# Patient Record
Sex: MALE | Race: WHITE | Employment: PART TIME | URBAN - NONMETROPOLITAN AREA
[De-identification: names, ages, dates, MRNs, and addresses within clinical notes are randomized per-mention and may not be internally consistent; named-entity substitution may affect disease eponyms.]

---

## 2017-01-10 ENCOUNTER — COMMUNICATION - GICH (OUTPATIENT)
Dept: FAMILY MEDICINE | Facility: OTHER | Age: 37
End: 2017-01-10

## 2018-01-02 NOTE — TELEPHONE ENCOUNTER
Patient Information     Patient Name MRN Jamari Bautista [id number] Male 1980      Telephone Encounter by Rubi Crouch at 1/10/2017  2:36 PM     Author:  Rubi Crouch Service:  (none) Author Type:  (none)     Filed:  1/10/2017  2:44 PM Encounter Date:  1/10/2017 Status:  Signed     :  Rubi Crouch            This patient was on [employer] but he's not anymore since he lives in [facility] now.  When he was, he had appointments at [facility] with Jhoana Kelly on 10/7/16 for a general medical exam (Z00.0), left knee pain (M25.562), back sciatica (M54.42), nicotine dependence (F17.200), chronic pain (G89.29), lumbago with sciatica, right side (M54.41), on 11/10/16 for back sciatica (M54.42), pain in right knee (M25.561), lumbago with sciatica, right side (M54.41), left knee pain (M25.562), and chronic pain (G89.29), and on 16 for encounter for immunization (Z23), lumbago with sciatica, right side (M54.41), chronic pain (G89.29), and unspecified osteoarthritis, unspecified site (M19.90).  Since we were the PCC through [facility], all insurance referrals needed to go through us.  Will you approve these insurance referrals?  Rubi Crouch ....................  1/10/2017   2:42 PM

## 2018-01-03 ENCOUNTER — HISTORY (OUTPATIENT)
Dept: EMERGENCY MEDICINE | Facility: OTHER | Age: 38
End: 2018-01-03

## 2018-01-03 NOTE — TELEPHONE ENCOUNTER
Patient Information     Patient Name MRN Jamari Bautista [id number] Male 1980      Telephone Encounter by Harriet Vicente MD at 1/10/2017  2:54 PM     Author:  Harriet Vicenet MD Service:  (none) Author Type:  Physician     Filed:  1/10/2017  2:55 PM Encounter Date:  1/10/2017 Status:  Signed     :  Harriet Vicente MD (Physician)            No contact with him since  and NO I will not!  Harriet Vicente MD  2:55 PM 1/10/2017

## 2018-01-03 NOTE — TELEPHONE ENCOUNTER
Patient Information     Patient Name MRN Jamari Bautista [id number] Male 1980      Telephone Encounter by Rubi rCouch at 1/10/2017  3:19 PM     Author:  Rubi Crouch Service:  (none) Author Type:  (none)     Filed:  1/10/2017  3:20 PM Encounter Date:  1/10/2017 Status:  Signed     :  Rubi Crouch            I called Mariia back at [facility] on 1/10/17 at [phone] at 3:18 and explained that I would not be able to do these referrals.  Rubi Crouch ....................  1/10/2017   3:19 PM

## 2018-01-04 ENCOUNTER — AMBULATORY - GICH (OUTPATIENT)
Dept: SCHEDULING | Facility: OTHER | Age: 38
End: 2018-01-04

## 2018-02-13 ENCOUNTER — DOCUMENTATION ONLY (OUTPATIENT)
Dept: FAMILY MEDICINE | Facility: OTHER | Age: 38
End: 2018-02-13

## 2018-02-13 PROBLEM — M54.50 LUMBAGO: Status: ACTIVE | Noted: 2018-02-13

## 2018-02-13 RX ORDER — BUPROPION HYDROCHLORIDE 150 MG/1
150 TABLET ORAL DAILY
COMMUNITY
Start: 2014-09-30 | End: 2024-08-06

## 2018-02-13 RX ORDER — OXYCODONE AND ACETAMINOPHEN 5; 325 MG/1; MG/1
1 TABLET ORAL EVERY 4 HOURS PRN
COMMUNITY
Start: 2018-01-03 | End: 2024-08-06

## 2018-02-13 RX ORDER — IBUPROFEN 800 MG/1
800 TABLET, FILM COATED ORAL EVERY 8 HOURS PRN
COMMUNITY
Start: 2012-09-21 | End: 2024-08-06

## 2018-02-13 RX ORDER — ONDANSETRON 4 MG/1
4 TABLET, ORALLY DISINTEGRATING ORAL EVERY 8 HOURS PRN
COMMUNITY
Start: 2018-01-03 | End: 2024-08-06

## 2020-03-17 ENCOUNTER — NURSE TRIAGE (OUTPATIENT)
Dept: FAMILY MEDICINE | Facility: OTHER | Age: 40
End: 2020-03-17

## 2020-03-17 NOTE — TELEPHONE ENCOUNTER
"Pt hard transferred to triage with c/o of dry cough and heaviness in lungs that started 1 week ago and has gotten worse in that the irritation started in throat and now feels as lungs are \"heavy\".     Pt traveled to [facility] mid last week    Pt denies fever, knowledge of flu/COVID 19 exposure, wheezing, chest pain, hx of respiratory illness/disease.  Pt describes cough as dry but was able to cough up clear sputum a couple of times. States he has a heaviness in lungs/chest that seems worse when climbing stairs but absent at rest.  Pt was able to speak complete sentences with ease.     Consulted with provider and as advised offered Pt clinic appt as long as he continued to be afebrile and educated on home care.  Pt understanding and will monitor for worsening of symptoms for now and call back with worsening of symptoms     Jimena Aragon RN  ....................  3/17/2020   5:06 PM                                                 "

## 2021-04-19 ENCOUNTER — ALLIED HEALTH/NURSE VISIT (OUTPATIENT)
Dept: FAMILY MEDICINE | Facility: OTHER | Age: 41
End: 2021-04-19
Attending: FAMILY MEDICINE
Payer: COMMERCIAL

## 2021-04-19 DIAGNOSIS — R50.9 FEVER AND CHILLS: Primary | ICD-10-CM

## 2021-04-19 DIAGNOSIS — R52 BODY ACHES: ICD-10-CM

## 2021-04-19 PROCEDURE — U0005 INFEC AGEN DETEC AMPLI PROBE: HCPCS | Mod: ZL | Performed by: FAMILY MEDICINE

## 2021-04-19 PROCEDURE — U0003 INFECTIOUS AGENT DETECTION BY NUCLEIC ACID (DNA OR RNA); SEVERE ACUTE RESPIRATORY SYNDROME CORONAVIRUS 2 (SARS-COV-2) (CORONAVIRUS DISEASE [COVID-19]), AMPLIFIED PROBE TECHNIQUE, MAKING USE OF HIGH THROUGHPUT TECHNOLOGIES AS DESCRIBED BY CMS-2020-01-R: HCPCS | Mod: ZL | Performed by: FAMILY MEDICINE

## 2021-04-19 PROCEDURE — C9803 HOPD COVID-19 SPEC COLLECT: HCPCS

## 2021-04-21 LAB
LABORATORY COMMENT REPORT: ABNORMAL
SARS-COV-2 RNA RESP QL NAA+PROBE: NORMAL
SARS-COV-2 RNA RESP QL NAA+PROBE: POSITIVE
SPECIMEN SOURCE: ABNORMAL
SPECIMEN SOURCE: NORMAL

## 2021-06-13 ENCOUNTER — HEALTH MAINTENANCE LETTER (OUTPATIENT)
Age: 41
End: 2021-06-13

## 2021-10-03 ENCOUNTER — HEALTH MAINTENANCE LETTER (OUTPATIENT)
Age: 41
End: 2021-10-03

## 2022-07-09 ENCOUNTER — HEALTH MAINTENANCE LETTER (OUTPATIENT)
Age: 42
End: 2022-07-09

## 2022-09-04 ENCOUNTER — HEALTH MAINTENANCE LETTER (OUTPATIENT)
Age: 42
End: 2022-09-04

## 2023-07-23 ENCOUNTER — HEALTH MAINTENANCE LETTER (OUTPATIENT)
Age: 43
End: 2023-07-23

## 2024-04-20 ENCOUNTER — OFFICE VISIT (OUTPATIENT)
Dept: FAMILY MEDICINE | Facility: OTHER | Age: 44
End: 2024-04-20
Attending: NURSE PRACTITIONER
Payer: COMMERCIAL

## 2024-04-20 VITALS
WEIGHT: 154.6 LBS | HEIGHT: 68 IN | DIASTOLIC BLOOD PRESSURE: 80 MMHG | RESPIRATION RATE: 17 BRPM | OXYGEN SATURATION: 96 % | SYSTOLIC BLOOD PRESSURE: 138 MMHG | HEART RATE: 87 BPM | BODY MASS INDEX: 23.43 KG/M2 | TEMPERATURE: 96.6 F

## 2024-04-20 DIAGNOSIS — T15.92XA FOREIGN BODY, EYE, LEFT, INITIAL ENCOUNTER: Primary | ICD-10-CM

## 2024-04-20 DIAGNOSIS — H57.12 DISCOMFORT OF LEFT EYE: ICD-10-CM

## 2024-04-20 PROCEDURE — 250N000009 HC RX 250

## 2024-04-20 PROCEDURE — 99213 OFFICE O/P EST LOW 20 MIN: CPT | Performed by: REGISTERED NURSE

## 2024-04-20 RX ORDER — ERYTHROMYCIN 5 MG/G
0.5 OINTMENT OPHTHALMIC 4 TIMES DAILY
Qty: 30 G | Refills: 0 | Status: SHIPPED | OUTPATIENT
Start: 2024-04-20 | End: 2024-08-06

## 2024-04-20 RX ORDER — TETRACAINE HYDROCHLORIDE 5 MG/ML
1-2 SOLUTION OPHTHALMIC ONCE
Status: COMPLETED | OUTPATIENT
Start: 2024-04-20 | End: 2024-04-20

## 2024-04-20 RX ADMIN — TETRACAINE HYDROCHLORIDE 2 DROP: 5 SOLUTION OPHTHALMIC at 13:15

## 2024-04-20 RX ADMIN — FLUORESCEIN SODIUM 1 STRIP: 1 STRIP OPHTHALMIC at 13:15

## 2024-04-20 ASSESSMENT — VISUAL ACUITY: OU: 1

## 2024-04-20 ASSESSMENT — PAIN SCALES - GENERAL: PAINLEVEL: MILD PAIN (2)

## 2024-04-20 NOTE — PATIENT INSTRUCTIONS
Use the ointment up to 4 times a day. This will help lubricate your eye and help with pain.   You will need to see an eye doctor on Monday.

## 2024-04-20 NOTE — NURSING NOTE
"Chief Complaint   Patient presents with    Eye Problem     Something in left eye     Patient here for something in left eye. He states he was cleaning a tote out and something got in his eye.    Initial /80   Pulse 87   Temp (!) 96.6  F (35.9  C) (Tympanic)   Resp 17   Ht 1.715 m (5' 7.5\")   Wt 70.1 kg (154 lb 9.6 oz)   SpO2 96%   BMI 23.86 kg/m   Estimated body mass index is 23.86 kg/m  as calculated from the following:    Height as of this encounter: 1.715 m (5' 7.5\").    Weight as of this encounter: 70.1 kg (154 lb 9.6 oz).  Medication Review: complete    The next two questions are to help us understand your food security.  If you are feeling you need any assistance in this area, we have resources available to support you today.          4/20/2024   SDOH- Food Insecurity   Within the past 12 months, did you worry that your food would run out before you got money to buy more? N   Within the past 12 months, did the food you bought just not last and you didn t have money to get more? N         Health Care Directive:  Patient does not have a Health Care Directive or Living Will: Discussed advance care planning with patient; however, patient declined at this time.    Dionna Daley LPN      "

## 2024-04-20 NOTE — PROGRESS NOTES
"Jamari Schafer  1980    ASSESSMENT/PLAN:   1. Foreign body, eye, left, initial encounter  2. Discomfort of left eye    Day history of foreign body to left eye.  He believes this is dirt or dust.  He has not been working with wood or metal.  Fluorescein exam performed to left eye with pinpoint speck noted over left iris at 3 o'clock position.  Unable to remove.  Will need follow-up with ophthalmology.  Erythromycin ointment 4 times daily over the weekend.  He has been instructed to present to the ophthalmologist first thing Monday morning.  Patient agreeable with this plan.    - tetracaine (PONTOCAINE) 0.5 % ophthalmic solution 1-2 drop  - fluorescein (FUL-ANDREEA) ophthalmic strip 1 strip  - sodium chloride (PF) 0.9% PF flush 3 mL  - erythromycin (ROMYCIN) 5 MG/GM ophthalmic ointment; Place 0.5 inches Into the left eye 4 times daily  Dispense: 30 g; Refill: 0       Patient agrees with plan of care and verbalizes understating. AVS printed. Patient education provided verbally and written instructions provided as requested. Patient made aware of emergent signs and symptoms to monitor for and when to seek additional care/follow up.     SUBJECTIVE:   CHIEF COMPLAINT/ REASON FOR VISIT  Patient presents with:  Eye Problem: Something in left eye     HISTORY OF PRESENT ILLNESS  Jamari Schafer is a pleasant 44 year old male presents to [facility] clinic today for evaluation of left eye irritation.  He feels like there is something in his eye.  Possibly dust.  He has not been working with any wood or metal.  He has irritation but no pain.  Denies double vision or blurry vision.      I have reviewed the nursing notes.  I have reviewed allergies, medication list, problem list, and past medical history.    REVIEW OF SYSTEMS  See HPI    VITAL SIGNS  Vitals:    04/20/24 1204   BP: 138/80   Pulse: 87   Resp: 17   Temp: (!) 96.6  F (35.9  C)   TempSrc: Tympanic   SpO2: 96%   Weight: 70.1 kg (154 lb 9.6 oz)   Height: 1.715 m (5' 7.5\")    "   Body mass index is 23.86 kg/m .    OBJECTIVE:   PHYSICAL EXAM  Physical Exam  Constitutional:       Appearance: He is not ill-appearing.   Eyes:      General: Vision grossly intact.         Left eye: Foreign body present.     Extraocular Movements: Extraocular movements intact.      Conjunctiva/sclera: Conjunctivae normal.      Slit lamp exam:     Left eye: Foreign body present.      Comments: Left eye speck noted at 3 o'clock position on iris.   Neurological:      Mental Status: He is alert.        DIAGNOSTICS  No results found for any visits on 04/20/24.     ANTHONY Simmons [facility]

## 2024-08-06 ENCOUNTER — OFFICE VISIT (OUTPATIENT)
Dept: FAMILY MEDICINE | Facility: OTHER | Age: 44
End: 2024-08-06
Attending: NURSE PRACTITIONER
Payer: COMMERCIAL

## 2024-08-06 VITALS
TEMPERATURE: 97.4 F | RESPIRATION RATE: 16 BRPM | BODY MASS INDEX: 25.61 KG/M2 | HEART RATE: 82 BPM | DIASTOLIC BLOOD PRESSURE: 74 MMHG | OXYGEN SATURATION: 97 % | WEIGHT: 169 LBS | SYSTOLIC BLOOD PRESSURE: 128 MMHG | HEIGHT: 68 IN

## 2024-08-06 DIAGNOSIS — F19.21 DRUG DEPENDENCE, IN REMISSION (H): ICD-10-CM

## 2024-08-06 DIAGNOSIS — Z00.00 ROUTINE GENERAL MEDICAL EXAMINATION AT A HEALTH CARE FACILITY: Primary | ICD-10-CM

## 2024-08-06 DIAGNOSIS — Z13.29 SCREENING FOR THYROID DISORDER: ICD-10-CM

## 2024-08-06 DIAGNOSIS — Z11.59 NEED FOR HEPATITIS C SCREENING TEST: ICD-10-CM

## 2024-08-06 DIAGNOSIS — Z72.0 TOBACCO USE: ICD-10-CM

## 2024-08-06 DIAGNOSIS — Z13.220 SCREENING FOR CHOLESTEROL LEVEL: ICD-10-CM

## 2024-08-06 DIAGNOSIS — F41.8 DEPRESSION WITH ANXIETY: ICD-10-CM

## 2024-08-06 DIAGNOSIS — Z11.4 SCREENING FOR HIV (HUMAN IMMUNODEFICIENCY VIRUS): ICD-10-CM

## 2024-08-06 DIAGNOSIS — Z01.84 IMMUNITY STATUS TESTING: ICD-10-CM

## 2024-08-06 DIAGNOSIS — Z23 ENCOUNTER FOR IMMUNIZATION: ICD-10-CM

## 2024-08-06 LAB
ALBUMIN SERPL BCG-MCNC: 4.9 G/DL (ref 3.5–5.2)
ALP SERPL-CCNC: 87 U/L (ref 40–150)
ALT SERPL W P-5'-P-CCNC: 26 U/L (ref 0–70)
ANION GAP SERPL CALCULATED.3IONS-SCNC: 9 MMOL/L (ref 7–15)
AST SERPL W P-5'-P-CCNC: 30 U/L (ref 0–45)
BASOPHILS # BLD AUTO: 0 10E3/UL (ref 0–0.2)
BASOPHILS NFR BLD AUTO: 0 %
BILIRUB SERPL-MCNC: 0.5 MG/DL
BUN SERPL-MCNC: 22 MG/DL (ref 6–20)
C TRACH DNA SPEC QL PROBE+SIG AMP: NEGATIVE
CALCIUM SERPL-MCNC: 10.2 MG/DL (ref 8.8–10.4)
CHLORIDE SERPL-SCNC: 103 MMOL/L (ref 98–107)
CHOLEST SERPL-MCNC: 244 MG/DL
CREAT SERPL-MCNC: 1.2 MG/DL (ref 0.67–1.17)
EGFRCR SERPLBLD CKD-EPI 2021: 76 ML/MIN/1.73M2
EOSINOPHIL # BLD AUTO: 0.3 10E3/UL (ref 0–0.7)
EOSINOPHIL NFR BLD AUTO: 4 %
ERYTHROCYTE [DISTWIDTH] IN BLOOD BY AUTOMATED COUNT: 13.3 % (ref 10–15)
FASTING STATUS PATIENT QL REPORTED: NO
FASTING STATUS PATIENT QL REPORTED: NO
GLUCOSE SERPL-MCNC: 63 MG/DL (ref 70–99)
HBV SURFACE AB SERPL IA-ACNC: <3.5 M[IU]/ML
HBV SURFACE AB SERPL IA-ACNC: NONREACTIVE M[IU]/ML
HBV SURFACE AG SERPL QL IA: NONREACTIVE
HCO3 SERPL-SCNC: 29 MMOL/L (ref 22–29)
HCT VFR BLD AUTO: 45.7 % (ref 40–53)
HCV AB SERPL QL IA: NONREACTIVE
HDLC SERPL-MCNC: 78 MG/DL
HGB BLD-MCNC: 15.5 G/DL (ref 13.3–17.7)
HIV 1+2 AB+HIV1 P24 AG SERPL QL IA: NONREACTIVE
IMM GRANULOCYTES # BLD: 0 10E3/UL
IMM GRANULOCYTES NFR BLD: 0 %
LDLC SERPL CALC-MCNC: 148 MG/DL
LYMPHOCYTES # BLD AUTO: 1.8 10E3/UL (ref 0.8–5.3)
LYMPHOCYTES NFR BLD AUTO: 27 %
MCH RBC QN AUTO: 28.4 PG (ref 26.5–33)
MCHC RBC AUTO-ENTMCNC: 33.9 G/DL (ref 31.5–36.5)
MCV RBC AUTO: 84 FL (ref 78–100)
MONOCYTES # BLD AUTO: 0.6 10E3/UL (ref 0–1.3)
MONOCYTES NFR BLD AUTO: 8 %
N GONORRHOEA DNA SPEC QL NAA+PROBE: NEGATIVE
NEUTROPHILS # BLD AUTO: 4.1 10E3/UL (ref 1.6–8.3)
NEUTROPHILS NFR BLD AUTO: 60 %
NONHDLC SERPL-MCNC: 166 MG/DL
NRBC # BLD AUTO: 0 10E3/UL
NRBC BLD AUTO-RTO: 0 /100
PLATELET # BLD AUTO: 272 10E3/UL (ref 150–450)
POTASSIUM SERPL-SCNC: 4.3 MMOL/L (ref 3.4–5.3)
PROT SERPL-MCNC: 7.7 G/DL (ref 6.4–8.3)
RBC # BLD AUTO: 5.45 10E6/UL (ref 4.4–5.9)
SODIUM SERPL-SCNC: 141 MMOL/L (ref 135–145)
TRIGL SERPL-MCNC: 90 MG/DL
TSH SERPL DL<=0.005 MIU/L-ACNC: 3.61 UIU/ML (ref 0.3–4.2)
WBC # BLD AUTO: 6.9 10E3/UL (ref 4–11)

## 2024-08-06 PROCEDURE — 36415 COLL VENOUS BLD VENIPUNCTURE: CPT | Mod: ZL | Performed by: NURSE PRACTITIONER

## 2024-08-06 PROCEDURE — 90471 IMMUNIZATION ADMIN: CPT | Performed by: NURSE PRACTITIONER

## 2024-08-06 PROCEDURE — 86803 HEPATITIS C AB TEST: CPT | Mod: ZL | Performed by: NURSE PRACTITIONER

## 2024-08-06 PROCEDURE — 84450 TRANSFERASE (AST) (SGOT): CPT | Mod: ZL | Performed by: NURSE PRACTITIONER

## 2024-08-06 PROCEDURE — 99213 OFFICE O/P EST LOW 20 MIN: CPT | Mod: 25 | Performed by: NURSE PRACTITIONER

## 2024-08-06 PROCEDURE — 87491 CHLMYD TRACH DNA AMP PROBE: CPT | Mod: ZL | Performed by: NURSE PRACTITIONER

## 2024-08-06 PROCEDURE — 99386 PREV VISIT NEW AGE 40-64: CPT | Mod: 25 | Performed by: NURSE PRACTITIONER

## 2024-08-06 PROCEDURE — 82465 ASSAY BLD/SERUM CHOLESTEROL: CPT | Mod: ZL | Performed by: NURSE PRACTITIONER

## 2024-08-06 PROCEDURE — 86706 HEP B SURFACE ANTIBODY: CPT | Mod: ZL | Performed by: NURSE PRACTITIONER

## 2024-08-06 PROCEDURE — 87389 HIV-1 AG W/HIV-1&-2 AB AG IA: CPT | Mod: ZL | Performed by: NURSE PRACTITIONER

## 2024-08-06 PROCEDURE — 86780 TREPONEMA PALLIDUM: CPT | Mod: ZL | Performed by: NURSE PRACTITIONER

## 2024-08-06 PROCEDURE — 90715 TDAP VACCINE 7 YRS/> IM: CPT | Performed by: NURSE PRACTITIONER

## 2024-08-06 PROCEDURE — 87340 HEPATITIS B SURFACE AG IA: CPT | Mod: ZL | Performed by: NURSE PRACTITIONER

## 2024-08-06 PROCEDURE — 85025 COMPLETE CBC W/AUTO DIFF WBC: CPT | Mod: ZL | Performed by: NURSE PRACTITIONER

## 2024-08-06 PROCEDURE — 84443 ASSAY THYROID STIM HORMONE: CPT | Mod: ZL | Performed by: NURSE PRACTITIONER

## 2024-08-06 SDOH — HEALTH STABILITY: PHYSICAL HEALTH: ON AVERAGE, HOW MANY DAYS PER WEEK DO YOU ENGAGE IN MODERATE TO STRENUOUS EXERCISE (LIKE A BRISK WALK)?: 5 DAYS

## 2024-08-06 ASSESSMENT — PATIENT HEALTH QUESTIONNAIRE - PHQ9
SUM OF ALL RESPONSES TO PHQ QUESTIONS 1-9: 7
SUM OF ALL RESPONSES TO PHQ QUESTIONS 1-9: 7
10. IF YOU CHECKED OFF ANY PROBLEMS, HOW DIFFICULT HAVE THESE PROBLEMS MADE IT FOR YOU TO DO YOUR WORK, TAKE CARE OF THINGS AT HOME, OR GET ALONG WITH OTHER PEOPLE: SOMEWHAT DIFFICULT

## 2024-08-06 ASSESSMENT — ANXIETY QUESTIONNAIRES
5. BEING SO RESTLESS THAT IT IS HARD TO SIT STILL: SEVERAL DAYS
3. WORRYING TOO MUCH ABOUT DIFFERENT THINGS: NOT AT ALL
7. FEELING AFRAID AS IF SOMETHING AWFUL MIGHT HAPPEN: NOT AT ALL
GAD7 TOTAL SCORE: 1
8. IF YOU CHECKED OFF ANY PROBLEMS, HOW DIFFICULT HAVE THESE MADE IT FOR YOU TO DO YOUR WORK, TAKE CARE OF THINGS AT HOME, OR GET ALONG WITH OTHER PEOPLE?: NOT DIFFICULT AT ALL
4. TROUBLE RELAXING: NOT AT ALL
GAD7 TOTAL SCORE: 1
1. FEELING NERVOUS, ANXIOUS, OR ON EDGE: NOT AT ALL
GAD7 TOTAL SCORE: 1
IF YOU CHECKED OFF ANY PROBLEMS ON THIS QUESTIONNAIRE, HOW DIFFICULT HAVE THESE PROBLEMS MADE IT FOR YOU TO DO YOUR WORK, TAKE CARE OF THINGS AT HOME, OR GET ALONG WITH OTHER PEOPLE: NOT DIFFICULT AT ALL
6. BECOMING EASILY ANNOYED OR IRRITABLE: NOT AT ALL
2. NOT BEING ABLE TO STOP OR CONTROL WORRYING: NOT AT ALL
7. FEELING AFRAID AS IF SOMETHING AWFUL MIGHT HAPPEN: NOT AT ALL

## 2024-08-06 ASSESSMENT — PAIN SCALES - GENERAL: PAINLEVEL: NO PAIN (0)

## 2024-08-06 ASSESSMENT — SOCIAL DETERMINANTS OF HEALTH (SDOH): HOW OFTEN DO YOU GET TOGETHER WITH FRIENDS OR RELATIVES?: MORE THAN THREE TIMES A WEEK

## 2024-08-06 NOTE — PATIENT INSTRUCTIONS
Screening for colon cancer is recommended at age 45. Make sure you get annual physical for this next year.   Labs today in process, std screening in process. Will be in touch with these results.   Recommend continuing to work with [facility] therapist as well as for adhd screening. I do not prescribe testosterone.       Patient Education   Preventive Care Advice   This is general advice given by our system to help you stay healthy. However, your care team may have specific advice just for you. Please talk to your care team about your preventive care needs.  Nutrition  Eat 5 or more servings of fruits and vegetables each day.  Try wheat bread, brown rice and whole grain pasta (instead of white bread, rice, and pasta).  Get enough calcium and vitamin D. Check the label on foods and aim for 100% of the RDA (recommended daily allowance).  Lifestyle  Exercise at least 150 minutes each week  (30 minutes a day, 5 days a week).  Do muscle strengthening activities 2 days a week. These help control your weight and prevent disease.  No smoking.  Wear sunscreen to prevent skin cancer.  Have a dental exam and cleaning every 6 months.  Yearly exams  See your health care team every year to talk about:  Any changes in your health.  Any medicines your care team has prescribed.  Preventive care, family planning, and ways to prevent chronic diseases.  Shots (vaccines)   HPV shots (up to age 26), if you've never had them before.  Hepatitis B shots (up to age 59), if you've never had them before.  COVID-19 shot: Get this shot when it's due.  Flu shot: Get a flu shot every year.  Tetanus shot: Get a tetanus shot every 10 years.  Pneumococcal, hepatitis A, and RSV shots: Ask your care team if you need these based on your risk.  Shingles shot (for age 50 and up)  General health tests  Diabetes screening:  Starting at age 35, Get screened for diabetes at least every 3 years.  If you are younger than age 35, ask your care team if you should  be screened for diabetes.  Cholesterol test: At age 39, start having a cholesterol test every 5 years, or more often if advised.  Bone density scan (DEXA): At age 50, ask your care team if you should have this scan for osteoporosis (brittle bones).  Hepatitis C: Get tested at least once in your life.  STIs (sexually transmitted infections)  Before age 24: Ask your care team if you should be screened for STIs.  After age 24: Get screened for STIs if you're at risk. You are at risk for STIs (including HIV) if:  You are sexually active with more than one person.  You don't use condoms every time.  You or a partner was diagnosed with a sexually transmitted infection.  If you are at risk for HIV, ask about PrEP medicine to prevent HIV.  Get tested for HIV at least once in your life, whether you are at risk for HIV or not.  Cancer screening tests  Cervical cancer screening: If you have a cervix, begin getting regular cervical cancer screening tests starting at age 21.  Breast cancer scan (mammogram): If you've ever had breasts, begin having regular mammograms starting at age 40. This is a scan to check for breast cancer.  Colon cancer screening: It is important to start screening for colon cancer at age 45.  Have a colonoscopy test every 10 years (or more often if you're at risk) Or, ask your provider about stool tests like a FIT test every year or Cologuard test every 3 years.  To learn more about your testing options, visit:   .  For help making a decision, visit:   https://bit.ly/vq81850.  Prostate cancer screening test: If you have a prostate, ask your care team if a prostate cancer screening test (PSA) at age 55 is right for you.  Lung cancer screening: If you are a current or former smoker ages 50 to 80, ask your care team if ongoing lung cancer screenings are right for you.  For informational purposes only. Not to replace the advice of your health care provider. Copyright   2023 [employer]. All rights  reserved. Clinically reviewed by the [facility] Transitions Program. [employer] 275899 - REV 01/24.  Learning About Depression Screening  What is depression screening?  Depression screening is a way to see if you have depression symptoms. It may be done by a doctor or counselor. It's often part of a routine checkup. That's because your mental health is just as important as your physical health.  Depression is a mental health condition that affects how you feel, think, and act. You may:  Have less energy.  Lose interest in your daily activities.  Feel sad and grouchy for a long time.  Depression is very common. It affects people of all ages.  Many things can lead to depression. Some people become depressed after they have a stroke or find out they have a major illness like cancer or heart disease. The death of a loved one or a breakup may lead to depression. It can run in families. Most experts believe that a combination of inherited genes and stressful life events can cause it.  What happens during screening?  You may be asked to fill out a form about your depression symptoms. You and the doctor will discuss your answers. The doctor may ask you more questions to learn more about how you think, act, and feel.  What happens after screening?  If you have symptoms of depression, your doctor will talk to you about your options.  Doctors usually treat depression with medicines or counseling. Often, combining the two works best. Many people don't get help because they think that they'll get over the depression on their own. But people with depression may not get better unless they get treatment.  The cause of depression is not well understood. There may be many factors involved. But if you have depression, it's not your fault.  A serious symptom of depression is thinking about death or suicide. If you or someone you care about talks about this or about feeling hopeless, get help right away.  It's important to know  "that depression can be treated. Medicine, counseling, and self-care may help.  Where can you learn more?  Go to https://www.[employer].net/patiented  Enter T185 in the search box to learn more about \"Learning About Depression Screening.\"  Current as of: June 24, 2023  Content Version: 14.1 2006-2024 [employer].   Care instructions adapted under license by your healthcare professional. If you have questions about a medical condition or this instruction, always ask your healthcare professional. [employer] disclaims any warranty or liability for your use of this information.    Substance Use Disorder: Care Instructions  Overview     You can improve your life and health by stopping your use of alcohol or drugs. When you don't drink or use drugs, you may feel and sleep better. You may get along better with your family, friends, and coworkers. There are medicines and programs that can help with substance use disorder.  How can you care for yourself at home?  Here are some ways to help you stay sober and prevent relapse.  If you have been given medicine to help keep you sober or reduce your cravings, be sure to take it exactly as prescribed.  Talk to your doctor about programs that can help you stop using drugs or drinking alcohol.  Do not keep alcohol or drugs in your home.  Plan ahead. Think about what you'll say if other people ask you to drink or use drugs. Try not to spend time with people who drink or use drugs.  Use the time and money spent on drinking or drugs to do something that's important to you.  Preventing a relapse  Have a plan to deal with relapse. Learn to recognize changes in your thinking that lead you to drink or use drugs. Get help before you start to drink or use drugs again.  Try to stay away from situations, friends, or places that may lead you to drink or use drugs.  If you feel the need to drink alcohol or use drugs again, seek help right away. Call a trusted friend " or family member. Some people get support from organizations such as Narcotics Anonymous or [employer] or from treatment facilities.  If you relapse, get help as soon as you can. Some people make a plan with another person that outlines what they want that person to do for them if they relapse. The plan usually includes how to handle the relapse and who to notify in case of relapse.  Don't give up. Remember that a relapse doesn't mean that you have failed. Use the experience to learn the triggers that lead you to drink or use drugs. Then quit again. Recovery is a lifelong process. Many people have several relapses before they are able to quit for good.  Follow-up care is a key part of your treatment and safety. Be sure to make and go to all appointments, and call your doctor if you are having problems. It's also a good idea to know your test results and keep a list of the medicines you take.  When should you call for help?   Call 911  anytime you think you may need emergency care. For example, call if you or someone else:    Has overdosed or has withdrawal signs. Be sure to tell the emergency workers that you are or someone else is using or trying to quit using drugs. Overdose or withdrawal signs may include:  Losing consciousness.  Seizure.  Seeing or hearing things that aren't there (hallucinations).     Is thinking or talking about suicide or harming others.   Where to get help 24 hours a day, 7 days a week   If you or someone you know talks about suicide, self-harm, a mental health crisis, a substance use crisis, or any other kind of emotional distress, get help right away. You can:    Call the Suicide and Crisis Lifeline at 446.     Call 8-664-662-TALK (1-115.238.7747).     Text HOME to 005400 to access the Crisis Text Line.   Consider saving these numbers in your phone.  Go to [employer].org for more information or to chat online.  Call your doctor now or seek immediate medical care if:    You are having  "withdrawal symptoms. These may include nausea or vomiting, sweating, shakiness, and anxiety.   Watch closely for changes in your health, and be sure to contact your doctor if:    You have a relapse.     You need more help or support to stop.   Where can you learn more?  Go to https://www.[employer].net/patiented  Enter H573 in the search box to learn more about \"Substance Use Disorder: Care Instructions.\"  Current as of: November 15, 2023               Content Version: 14.0    6593-6477 [employer].   Care instructions adapted under license by your healthcare professional. If you have questions about a medical condition or this instruction, always ask your healthcare professional. [employer] disclaims any warranty or liability for your use of this information.      Safer Sex: Care Instructions  Overview  Safer sex is a way to reduce your risk of getting a sexually transmitted infection (STI). It can also help prevent pregnancy.  Several products can help you practice safer sex and reduce your chance of STIs. One of the best is a condom. There are internal and external condoms. You can use a special rubber sheet (dental dam) for protection during oral sex. Disposable gloves can keep your hands from touching blood, semen, or other body fluids that can carry infections.  Remember that birth control methods such as diaphragms, IUDs, foams, and birth control pills do not stop you from getting STIs.  Follow-up care is a key part of your treatment and safety. Be sure to make and go to all appointments, and call your doctor if you are having problems. It's also a good idea to know your test results and keep a list of the medicines you take.  How can you care for yourself at home?  Think about getting vaccinated to help prevent hepatitis A, hepatitis B, and human papillomavirus (HPV). They can be spread through sex.  Use a condom every time you have sex. Use an external condom, which goes on the " "penis. Or use an internal condom, which goes into the vagina or anus.  Make sure you use the right size external condom. A condom that's too small can break easily. A condom that's too big can slip off during sex.  Use a new condom each time you have sex. Be careful not to poke a hole in the condom when you open the wrapper.  Don't use an internal condom and an external condom at the same time.  Never use petroleum jelly (such as Vaseline), grease, hand lotion, baby oil, or anything with oil in it. These products can make holes in the condom.  After intercourse, hold the edge of the condom as you remove it. This will help keep semen from spilling out of the condom.  Do not have sex with anyone who has symptoms of an STI, such as sores on the genitals or mouth.  Do not drink a lot of alcohol or use drugs before sex.  Limit your sex partners. Sex with one partner who has sex only with you can reduce your risk of getting an STI.  Don't share sex toys. But if you do share them, use a condom and clean the sex toys between each use.  Talk to any partners before you have sex. Talk about what you feel comfortable with and whether you have any boundaries with sex. And find out if your partner or partners may be at risk for any STI. Keep in mind that a person may be able to spread an STI even if they do not have symptoms. You and any partners may want to get tested for STIs.  Where can you learn more?  Go to https://www."[employer].net/patiented  Enter B608 in the search box to learn more about \"Safer Sex: Care Instructions.\"  Current as of: November 27, 2023               Content Version: 14.0    5931-2115 [employer].   Care instructions adapted under license by your healthcare professional. If you have questions about a medical condition or this instruction, always ask your healthcare professional. [employer] disclaims any warranty or liability for your use of this information.         "

## 2024-08-06 NOTE — NURSING NOTE
"Chief Complaint   Patient presents with    Physical       Initial /74   Pulse 82   Temp 97.4  F (36.3  C) (Tympanic)   Resp 16   Ht 1.727 m (5' 8\")   Wt 76.7 kg (169 lb)   SpO2 97%   BMI 25.70 kg/m   Estimated body mass index is 25.7 kg/m  as calculated from the following:    Height as of this encounter: 1.727 m (5' 8\").    Weight as of this encounter: 76.7 kg (169 lb).  Medication Review: complete    The next two questions are to help us understand your food security.  If you are feeling you need any assistance in this area, we have resources available to support you today.          8/6/2024   SDOH- Food Insecurity   Within the past 12 months, did you worry that your food would run out before you got money to buy more? N   Within the past 12 months, did the food you bought just not last and you didn t have money to get more? N            Health Care Directive:  Patient does not have a Health Care Directive or Living Will: Discussed advance care planning with patient; however, patient declined at this time.    Karin Bateman CMA    Immunization Documentation    Prior to Immunization administration, verified patients identity using patient's name and date of birth. Please see IMMUNIZATIONS  and order for additional information.  Patient / Parent instructed to remain in clinic for 15 minutes and report any adverse reaction to staff immediately.    Was the entire amount of vaccines given used? Yes    Karin Bateman CMA  8/6/2024   10:03 AM          "

## 2024-08-06 NOTE — LETTER
August 7, 2024      Jamari Schafer  [address]  [facility] [address]        Dear ,    We are writing to inform you of your test results.    Your cholesterol levels are elevated somewhat but are still considered low risk for cardiovascular disease and we do not need to treat this with medication at this time. I recommend exercise and eating well for treatment.     Negative gonorrhea and chlamydia test results - no treatment indicated.     Normal electrolyte levels, kidney function is stable. Normal liver function tests. Normal blood counts.     You are NOT immune to hepatitis B and that we recommend you schedule injection appointments to receive the hepatitis b immunization series.     You do not have HIV. The screening test result was negative for HIV.     Resulted Orders   Lipid Panel   Result Value Ref Range    Cholesterol 244 (H) <200 mg/dL    Triglycerides 90 <150 mg/dL    Direct Measure HDL 78 >=40 mg/dL    LDL Cholesterol Calculated 148 (H) <=100 mg/dL    Non HDL Cholesterol 166 (H) <130 mg/dL    Patient Fasting > 8hrs? No     Narrative    Cholesterol  Desirable:  <200 mg/dL    Triglycerides  Normal:  Less than 150 mg/dL  Borderline High:  150-199 mg/dL  High:  200-499 mg/dL  Very High:  Greater than or equal to 500 mg/dL    Direct Measure HDL  Female:  Greater than or equal to 50 mg/dL   Male:  Greater than or equal to 40 mg/dL    LDL Cholesterol  Desirable:  <100mg/dL  Above Desirable:  100-129 mg/dL   Borderline High:  130-159 mg/dL   High:  160-189 mg/dL   Very High:  >= 190 mg/dL    Non HDL Cholesterol  Desirable:  130 mg/dL  Above Desirable:  130-159 mg/dL  Borderline High:  160-189 mg/dL  High:  190-219 mg/dL  Very High:  Greater than or equal to 220 mg/dL   Comprehensive Metabolic Panel   Result Value Ref Range    Sodium 141 135 - 145 mmol/L    Potassium 4.3 3.4 - 5.3 mmol/L    Carbon Dioxide (CO2) 29 22 - 29 mmol/L    Anion Gap 9 7 - 15 mmol/L    Urea Nitrogen 22.0 (H) 6.0 - 20.0 mg/dL     Creatinine 1.20 (H) 0.67 - 1.17 mg/dL    GFR Estimate 76 >60 mL/min/1.73m2      Comment:      eGFR calculated using 2021 CKD-EPI equation.    Calcium 10.2 8.8 - 10.4 mg/dL      Comment:      Reference intervals for this test were updated on 7/16/2024 to reflect our healthy population more accurately. There may be differences in the flagging of prior results with similar values performed with this method. Those prior results can be interpreted in the context of the updated reference intervals.    Chloride 103 98 - 107 mmol/L    Glucose 63 (L) 70 - 99 mg/dL    Alkaline Phosphatase 87 40 - 150 U/L    AST 30 0 - 45 U/L    ALT 26 0 - 70 U/L    Protein Total 7.7 6.4 - 8.3 g/dL    Albumin 4.9 3.5 - 5.2 g/dL    Bilirubin Total 0.5 <=1.2 mg/dL    Patient Fasting > 8hrs? No    TSH Reflex GH   Result Value Ref Range    TSH 3.61 0.30 - 4.20 uIU/mL   GC/Chlamydia by PCR   Result Value Ref Range    Chlamydia Trachomatis Negative Negative      Comment:      Negative for C. trachomatis rRNA by transcription mediated amplification.   A negative result by transcription mediated amplification does not preclude the presence of infection because results are dependent on proper and adequate collection, absence of inhibitors and sufficient rRNA to be detected.    Neisseria gonorrhoeae Negative Negative      Comment:      Negative for N. gonorrhoeae rRNA by transcription mediated amplification. A negative result by transcription mediated amplification does not preclude the presence of C. trachomatis infection because results are dependent on proper and adequate collection, absence of inhibitors and sufficient rRNA to be detected.    Narrative    Assay performed using [id number] real-time, reverse-transcriptase PCR.   CBC with platelets and differential   Result Value Ref Range    WBC Count 6.9 4.0 - 11.0 10e3/uL    RBC Count 5.45 4.40 - 5.90 10e6/uL    Hemoglobin 15.5 13.3 - 17.7 g/dL    Hematocrit 45.7 40.0 - 53.0 %    MCV 84 78 - 100 fL     MCH 28.4 26.5 - 33.0 pg    MCHC 33.9 31.5 - 36.5 g/dL    RDW 13.3 10.0 - 15.0 %    Platelet Count 272 150 - 450 10e3/uL    % Neutrophils 60 %    % Lymphocytes 27 %    % Monocytes 8 %    % Eosinophils 4 %    % Basophils 0 %    % Immature Granulocytes 0 %    NRBCs per 100 WBC 0 <1 /100    Absolute Neutrophils 4.1 1.6 - 8.3 10e3/uL    Absolute Lymphocytes 1.8 0.8 - 5.3 10e3/uL    Absolute Monocytes 0.6 0.0 - 1.3 10e3/uL    Absolute Eosinophils 0.3 0.0 - 0.7 10e3/uL    Absolute Basophils 0.0 0.0 - 0.2 10e3/uL    Absolute Immature Granulocytes 0.0 <=0.4 10e3/uL    Absolute NRBCs 0.0 10e3/uL       If you have any questions or concerns, please call the clinic at the number listed above.       Sincerely,      Karin Lopez NP

## 2024-08-06 NOTE — PROGRESS NOTES
Preventive Care Visit  [facility]  Karin Lopez NP, Family Medicine  Aug 6, 2024      Assessment & Plan   Problem List Items Addressed This Visit       Depression with anxiety    Drug dependence, in remission (H)    Tobacco use     Other Visit Diagnoses       Routine general medical examination at a health care facility    -  Primary    Relevant Orders    Lipid Panel (Completed)    CBC and Differential (Completed)    Comprehensive Metabolic Panel (Completed)    TSH Reflex GH (Completed)    HIV Antigen Antibody Combo (Completed)    Hepatitis C Screen Reflex to HCV RNA Quant and Genotype (Completed)    GC/Chlamydia by PCR (Completed)    Treponema Ab w Reflex to RPR and Titer (Completed)    Screening for HIV (human immunodeficiency virus)        Relevant Orders    HIV Antigen Antibody Combo (Completed)    Need for hepatitis C screening test        Relevant Orders    Hepatitis C Screen Reflex to HCV RNA Quant and Genotype (Completed)    Screening for cholesterol level        Relevant Orders    Lipid Panel (Completed)    Screening for thyroid disorder        Relevant Orders    TSH Reflex GH (Completed)    Immunity status testing        Relevant Orders    Hepatitis B Surface Antibody (Completed)    Hepatitis B Surface Antigen (Completed)    Encounter for immunization        Relevant Orders    TDAP 7+ (ADACEL,BOOSTRIX) (Completed)             Patient has been advised of split billing requirements and indicates understanding: Yes    1. Routine general medical examination at a health care facility  Normal blood counts, electrolytes, renal function, tsh, and negative std screening.   - Lipid Panel  - CBC and Differential  - Comprehensive Metabolic Panel  - TSH Reflex GH  - HIV Antigen Antibody Combo  - Hepatitis C Screen Reflex to HCV RNA Quant and Genotype  - GC/Chlamydia by PCR  - Treponema Ab w Reflex to RPR and Titer    2. Drug dependence, in remission (H)  Continue with therapy. Currently at  "[facility].     3. Depression with anxiety  Stable. Not on any medications currently.     4. Tobacco use  Recommend smoking cessation.     5. Screening for HIV (human immunodeficiency virus)  Negative hiv screen  - HIV Antigen Antibody Combo    6. Need for hepatitis C screening test  Negative test   - Hepatitis C Screen Reflex to HCV RNA Quant and Genotype    7. Screening for cholesterol level  Elevated lipid panel; total cholesterol is 244. No indication for statin. Recommend diet/exercise and lifestyle changes   - Lipid Panel    8. Screening for thyroid disorder  Normal tsh  - TSH Reflex GH    9. Immunity status testing  Inadequate immunity is suggested from test results; recommend hepatitis b series. No active hepatitis b infection.   - Hepatitis B Surface Antigen  - Hepatitis B Surface Antibody    10. Encounter for immunization  Updated today  - TDAP 7+ (ADACEL,BOOSTRIX)       BMI  Estimated body mass index is 25.7 kg/m  as calculated from the following:    Height as of this encounter: 1.727 m (5' 8\").    Weight as of this encounter: 76.7 kg (169 lb).       Counseling  Appropriate preventive services were addressed with this patient via screening, questionnaire, or discussion as appropriate for fall prevention, nutrition, physical activity, Tobacco-use cessation, weight loss and cognition.  Checklist reviewing preventive services available has been given to the patient.  Reviewed patient's diet, addressing concerns and/or questions.   The patient was instructed to see the dentist every 6 months.   The patient's PHQ-9 score is consistent with mild depression. He was provided with information regarding depression.     Work on weight loss  Regular exercise    Return in about 53 weeks (around 8/12/2025) for Annual Wellness Visit.      Ze Kauffman is a 44 year old, presenting for the following:  Physical        8/6/2024     8:55 AM   Additional Questions   Roomed by MARQUES Frazier   Accompanied " by Self         8/6/2024   Forms   Any forms needing to be completed Yes           Health Care Directive  Patient does not have a Health Care Directive or Living Will: Discussed advance care planning with patient; however, patient declined at this time.    ZOË Kauffman is here today for a physical. He presents from [facility] inpatient where he is currently being treated for methamphetamine abuse. Moods ok, stable. No concerns for depression. Currently sober from meth. Motivated to stay sober. Works at the [employer]. Has had intermittent but chronic problems with substance abuse over the years. He has grown children who he has great relationships with. He is close with his son's mom.     Has a couple chipped teeth - does not currently have a dentist but is working on that. No pain associated with the teeth.     Asks about ADHD - he wonders if meth addition issues have been partially self medicating for possible ADHD. Has a hard time with reading despite he is strong reader.    1:1 therapy has been helpful for him, he is doing better emotionally. Does not take any medications for anxiety or depression currently.     No physical health concerns at this time.         8/6/2024   General Health   How would you rate your overall physical health? Good   Feel stress (tense, anxious, or unable to sleep) Not at all            8/6/2024   Nutrition   Three or more servings of calcium each day? Yes   Diet: Regular (no restrictions)   How many servings of fruit and vegetables per day? (!) 2-3   How many sweetened beverages each day? (!) 3            8/6/2024   Exercise   Days per week of moderate/strenous exercise 5 days            8/6/2024   Social Factors   Frequency of gathering with friends or relatives More than three times a week   Worry food won't last until get money to buy more No   Food not last or not have enough money for food? No   Do you have housing? (Housing is defined as stable permanent housing and does  not include staying ouside in a car, in a tent, in an abandoned building, in an overnight shelter, or couch-surfing.) No   Are you worried about losing your housing? No   Lack of transportation? No   Unable to get utilities (heat,electricity)? No   Want help with housing or utility concern? (!) YES      (!) HOUSING CONCERN PRESENT      8/6/2024   Dental   Dentist two times every year? (!) NO            8/6/2024   TB Screening   Were you born outside of the [place]? No          Today's PHQ-9 Score:       8/6/2024     8:47 AM   PHQ-9 SCORE   PHQ-9 Total Score MyChart 7 (Mild depression)   PHQ-9 Total Score 7         8/6/2024   Substance Use   Alcohol more than 3/day or more than 7/wk No   Do you use any other substances recreationally? (!) AMPHETAMINES        Social History     Tobacco Use    Smoking status: Former     Current packs/day: 0.75     Average packs/day: 0.8 packs/day for 18.0 years (13.5 ttl pk-yrs)     Types: Cigarettes    Smokeless tobacco: Never    Tobacco comments:     Quit in 2017   Vaping Use    Vaping status: Never Used   Substance Use Topics    Alcohol use: Not Currently    Drug use: Not Currently     Types: Methamphetamines             8/6/2024   One time HIV Screening   Previous HIV test? Yes          8/6/2024   STI Screening   New sexual partner(s) since last STI/HIV test? (!) YES would like std screening today       ASCVD Risk   The 10-year ASCVD risk score (Norman RAMOS, et al., 2019) is: 1.4%    Values used to calculate the score:      Age: 44 years      Sex: Male      Is Non- : No      Diabetic: No      Tobacco smoker: No      Systolic Blood Pressure: 128 mmHg      Is BP treated: No      HDL Cholesterol: 78 mg/dL      Total Cholesterol: 244 mg/dL        8/6/2024   Contraception/Family Planning   Questions about contraception or family planning No           Reviewed and updated as needed this visit by Provider     Meds    Surg Hx  Fam Hx              Review of  "Systems  Constitutional, HEENT, cardiovascular, pulmonary, GI, , musculoskeletal, neuro, skin, endocrine and psych systems are negative, except as otherwise noted.     Objective    Exam  /74   Pulse 82   Temp 97.4  F (36.3  C) (Tympanic)   Resp 16   Ht 1.727 m (5' 8\")   Wt 76.7 kg (169 lb)   SpO2 97%   BMI 25.70 kg/m     Estimated body mass index is 25.7 kg/m  as calculated from the following:    Height as of this encounter: 1.727 m (5' 8\").    Weight as of this encounter: 76.7 kg (169 lb).    Physical Exam  Vitals and nursing note reviewed.   Constitutional:       General: He is not in acute distress.     Appearance: Normal appearance. He is not ill-appearing.   HENT:      Head: Normocephalic and atraumatic.      Right Ear: Tympanic membrane, ear canal and external ear normal. There is no impacted cerumen.      Left Ear: Tympanic membrane, ear canal and external ear normal. There is no impacted cerumen.      Nose: Nose normal. No congestion or rhinorrhea.      Mouth/Throat:      Mouth: Mucous membranes are moist.      Pharynx: No oropharyngeal exudate or posterior oropharyngeal erythema.   Eyes:      General:         Right eye: No discharge.         Left eye: No discharge.      Pupils: Pupils are equal, round, and reactive to light.   Neck:      Vascular: No carotid bruit.   Cardiovascular:      Rate and Rhythm: Normal rate and regular rhythm.      Heart sounds: Normal heart sounds. No murmur heard.  Pulmonary:      Effort: Pulmonary effort is normal. No respiratory distress.      Breath sounds: Normal breath sounds. No wheezing.   Abdominal:      General: Abdomen is flat. Bowel sounds are normal. There is no distension.      Palpations: Abdomen is soft. There is no mass.      Tenderness: There is no abdominal tenderness. There is no right CVA tenderness, left CVA tenderness, guarding or rebound.      Hernia: No hernia is present.   Genitourinary:     Comments: deferred  Musculoskeletal:         " General: Normal range of motion.      Cervical back: Normal range of motion and neck supple. No rigidity or tenderness.   Lymphadenopathy:      Cervical: No cervical adenopathy.   Skin:     General: Skin is warm and dry.      Coloration: Skin is not jaundiced or pale.   Neurological:      General: No focal deficit present.      Mental Status: He is alert and oriented to person, place, and time.   Psychiatric:         Mood and Affect: Mood normal.         Behavior: Behavior normal.         Results for orders placed or performed in visit on 08/06/24   Lipid Panel     Status: Abnormal   Result Value Ref Range    Cholesterol 244 (H) <200 mg/dL    Triglycerides 90 <150 mg/dL    Direct Measure HDL 78 >=40 mg/dL    LDL Cholesterol Calculated 148 (H) <=100 mg/dL    Non HDL Cholesterol 166 (H) <130 mg/dL    Patient Fasting > 8hrs? No     Narrative    Cholesterol  Desirable:  <200 mg/dL    Triglycerides  Normal:  Less than 150 mg/dL  Borderline High:  150-199 mg/dL  High:  200-499 mg/dL  Very High:  Greater than or equal to 500 mg/dL    Direct Measure HDL  Female:  Greater than or equal to 50 mg/dL   Male:  Greater than or equal to 40 mg/dL    LDL Cholesterol  Desirable:  <100mg/dL  Above Desirable:  100-129 mg/dL   Borderline High:  130-159 mg/dL   High:  160-189 mg/dL   Very High:  >= 190 mg/dL    Non HDL Cholesterol  Desirable:  130 mg/dL  Above Desirable:  130-159 mg/dL  Borderline High:  160-189 mg/dL  High:  190-219 mg/dL  Very High:  Greater than or equal to 220 mg/dL   Comprehensive Metabolic Panel     Status: Abnormal   Result Value Ref Range    Sodium 141 135 - 145 mmol/L    Potassium 4.3 3.4 - 5.3 mmol/L    Carbon Dioxide (CO2) 29 22 - 29 mmol/L    Anion Gap 9 7 - 15 mmol/L    Urea Nitrogen 22.0 (H) 6.0 - 20.0 mg/dL    Creatinine 1.20 (H) 0.67 - 1.17 mg/dL    GFR Estimate 76 >60 mL/min/1.73m2    Calcium 10.2 8.8 - 10.4 mg/dL    Chloride 103 98 - 107 mmol/L    Glucose 63 (L) 70 - 99 mg/dL    Alkaline Phosphatase 87  40 - 150 U/L    AST 30 0 - 45 U/L    ALT 26 0 - 70 U/L    Protein Total 7.7 6.4 - 8.3 g/dL    Albumin 4.9 3.5 - 5.2 g/dL    Bilirubin Total 0.5 <=1.2 mg/dL    Patient Fasting > 8hrs? No    TSH Reflex GH     Status: Normal   Result Value Ref Range    TSH 3.61 0.30 - 4.20 uIU/mL   HIV Antigen Antibody Combo     Status: Normal   Result Value Ref Range    HIV Antigen Antibody Combo Nonreactive Nonreactive   Hepatitis C Screen Reflex to HCV RNA Quant and Genotype     Status: Normal   Result Value Ref Range    Hepatitis C Antibody Nonreactive Nonreactive   Treponema Ab w Reflex to RPR and Titer     Status: Normal   Result Value Ref Range    Treponema Antibody Total Nonreactive Nonreactive   Hepatitis B Surface Antigen     Status: Normal   Result Value Ref Range    Hepatitis B Surface Antigen Nonreactive Nonreactive   Hepatitis B Surface Antibody     Status: None   Result Value Ref Range    Hepatitis B Surface Antibody Nonreactive     Hepatitis B Surface Antibody Instrument Value <3.50 <8.5 m[IU]/mL   CBC with platelets and differential     Status: None   Result Value Ref Range    WBC Count 6.9 4.0 - 11.0 10e3/uL    RBC Count 5.45 4.40 - 5.90 10e6/uL    Hemoglobin 15.5 13.3 - 17.7 g/dL    Hematocrit 45.7 40.0 - 53.0 %    MCV 84 78 - 100 fL    MCH 28.4 26.5 - 33.0 pg    MCHC 33.9 31.5 - 36.5 g/dL    RDW 13.3 10.0 - 15.0 %    Platelet Count 272 150 - 450 10e3/uL    % Neutrophils 60 %    % Lymphocytes 27 %    % Monocytes 8 %    % Eosinophils 4 %    % Basophils 0 %    % Immature Granulocytes 0 %    NRBCs per 100 WBC 0 <1 /100    Absolute Neutrophils 4.1 1.6 - 8.3 10e3/uL    Absolute Lymphocytes 1.8 0.8 - 5.3 10e3/uL    Absolute Monocytes 0.6 0.0 - 1.3 10e3/uL    Absolute Eosinophils 0.3 0.0 - 0.7 10e3/uL    Absolute Basophils 0.0 0.0 - 0.2 10e3/uL    Absolute Immature Granulocytes 0.0 <=0.4 10e3/uL    Absolute NRBCs 0.0 10e3/uL   GC/Chlamydia by PCR     Status: Normal    Specimen: Urine, Voided   Result Value Ref Range     Chlamydia Trachomatis Negative Negative    Neisseria gonorrhoeae Negative Negative    Narrative    Assay performed using [employer] real-time, reverse-transcriptase PCR.   CBC and Differential     Status: None    Narrative    The following orders were created for panel order CBC and Differential.  Procedure                               Abnormality         Status                     ---------                               -----------         ------                     CBC with platelets and d...[[id number]]                      Final result                 Please view results for these tests on the individual orders.         Signed Electronically by: Karin Lopez NP    Answers submitted by the patient for this visit:  Patient Health Questionnaire (Submitted on 8/6/2024)  If you checked off any problems, how difficult have these problems made it for you to do your work, take care of things at home, or get along with other people?: Somewhat difficult  PHQ9 TOTAL SCORE: 7  MARIA TERESA-7 (Submitted on 8/6/2024)  MARIA TERESA 7 TOTAL SCORE: 1

## 2024-08-07 LAB — T PALLIDUM AB SER QL: NONREACTIVE

## (undated) RX ORDER — TETRACAINE HYDROCHLORIDE 5 MG/ML
SOLUTION OPHTHALMIC
Status: DISPENSED
Start: 2024-04-20

## (undated) RX ORDER — SODIUM CHLORIDE FOR INHALATION 0.9 %
VIAL, NEBULIZER (ML) INHALATION
Status: DISPENSED
Start: 2024-04-20